# Patient Record
Sex: FEMALE | Race: WHITE | NOT HISPANIC OR LATINO
[De-identification: names, ages, dates, MRNs, and addresses within clinical notes are randomized per-mention and may not be internally consistent; named-entity substitution may affect disease eponyms.]

---

## 2017-03-06 ENCOUNTER — APPOINTMENT (OUTPATIENT)
Dept: ORTHOPEDIC SURGERY | Facility: CLINIC | Age: 42
End: 2017-03-06

## 2019-02-14 ENCOUNTER — APPOINTMENT (OUTPATIENT)
Dept: OTOLARYNGOLOGY | Facility: CLINIC | Age: 44
End: 2019-02-14
Payer: COMMERCIAL

## 2019-02-14 VITALS
RESPIRATION RATE: 16 BRPM | SYSTOLIC BLOOD PRESSURE: 108 MMHG | HEIGHT: 66 IN | OXYGEN SATURATION: 98 % | WEIGHT: 166 LBS | BODY MASS INDEX: 26.68 KG/M2 | DIASTOLIC BLOOD PRESSURE: 72 MMHG | HEART RATE: 77 BPM

## 2019-02-14 DIAGNOSIS — H83.3X3 NOISE EFFECTS ON INNER EAR, BILATERAL: ICD-10-CM

## 2019-02-14 DIAGNOSIS — H91.90 UNSPECIFIED HEARING LOSS, UNSPECIFIED EAR: ICD-10-CM

## 2019-02-14 DIAGNOSIS — H61.23 IMPACTED CERUMEN, BILATERAL: ICD-10-CM

## 2019-02-14 PROCEDURE — 99202 OFFICE O/P NEW SF 15 MIN: CPT | Mod: 25

## 2019-02-14 PROCEDURE — 69210 REMOVE IMPACTED EAR WAX UNI: CPT

## 2019-02-14 NOTE — HISTORY OF PRESENT ILLNESS
[de-identified] : Age: 43\par Gender: F\par Main complaint: Plugged right ear with hearing loss\par Associated symptoms: Intermittent noise sensitivity\par Duration/Timing: Past few weeks\par Severity of symptoms: Moderate\par Modifying factors: None\par \par \par

## 2019-02-14 NOTE — ASSESSMENT
[FreeTextEntry1] : Pt to see how things go now that the wax is gone.  If the hearing issues or noise sensitivity persist we will obatin a hearing evaluation

## 2019-02-14 NOTE — REASON FOR VISIT
[Initial Evaluation] : an initial evaluation for [FreeTextEntry2] : plugged right ear, hearing loss

## 2019-02-14 NOTE — REVIEW OF SYSTEMS
[Ear Pain] : ear pain [Ear Itch] : ear itch [Lightheadedness] : lightheadedness [Ear Noises] : ear noises [Throat Clearing] : throat clearing [Negative] : Heme/Lymph [Hearing Loss] : no hearing loss [Dizziness] : no dizziness [Vertigo] : no vertigo [Recurrent Ear Infections] : no recurrent ear infections [Ear Drainage] : no ear drainage [Hoarseness] : no hoarseness [Throat Pain] : no throat pain [Throat Dryness] : no throat dryness [Throat Itching] : no throat itching

## 2019-02-14 NOTE — CONSULT LETTER
[Dear  ___] : Dear  [unfilled], [Consult Letter:] : I had the pleasure of evaluating your patient, [unfilled]. [Please see my note below.] : Please see my note below. [Consult Closing:] : Thank you very much for allowing me to participate in the care of this patient.  If you have any questions, please do not hesitate to contact me. [Sincerely,] : Sincerely, [FreeTextEntry2] : Cristobal Morales MD [FreeTextEntry3] : Gerard Alanis MD, FACS\par Clinical \par Dept. of Otolaryngology\par [facility]\par

## 2019-02-14 NOTE — PHYSICAL EXAM
[de-identified] : CI bilaterally.  Removed.   [Midline] : trachea located in midline position [Normal] : no rashes

## 2019-02-25 ENCOUNTER — APPOINTMENT (OUTPATIENT)
Dept: SPEECH THERAPY | Facility: CLINIC | Age: 44
End: 2019-02-25

## 2019-02-26 ENCOUNTER — APPOINTMENT (OUTPATIENT)
Dept: OTOLARYNGOLOGY | Facility: CLINIC | Age: 44
End: 2019-02-26